# Patient Record
Sex: FEMALE | Race: WHITE | NOT HISPANIC OR LATINO | Employment: UNEMPLOYED | URBAN - METROPOLITAN AREA
[De-identification: names, ages, dates, MRNs, and addresses within clinical notes are randomized per-mention and may not be internally consistent; named-entity substitution may affect disease eponyms.]

---

## 2018-11-20 ENCOUNTER — CONSULT (OUTPATIENT)
Dept: VASCULAR SURGERY | Facility: CLINIC | Age: 38
End: 2018-11-20
Payer: COMMERCIAL

## 2018-11-20 VITALS
HEART RATE: 90 BPM | SYSTOLIC BLOOD PRESSURE: 128 MMHG | HEIGHT: 70 IN | BODY MASS INDEX: 20.62 KG/M2 | WEIGHT: 144 LBS | TEMPERATURE: 98.5 F | DIASTOLIC BLOOD PRESSURE: 78 MMHG

## 2018-11-20 DIAGNOSIS — I83.893 SYMPTOMATIC VARICOSE VEINS OF BOTH LOWER EXTREMITIES: Primary | ICD-10-CM

## 2018-11-20 PROCEDURE — 99203 OFFICE O/P NEW LOW 30 MIN: CPT | Performed by: PHYSICIAN ASSISTANT

## 2018-11-20 NOTE — PATIENT INSTRUCTIONS
Varicose Veins - symptomatic      L>R symptomatic by lateral varicose veins  Longstanding history of varicose veins present which which worsened after pregnancy  There is a left inner thigh truncal a group of veins which occasionally are painful  There is also a small group of spider veins to the right calf which are not particularly bothersome  She complains of leg heaviness and bilateral lower extremity edema at times  She also has symptoms at night on occasion with heavy it she legs  There are good 2+ PT DP pulses bilaterally  The extremities are warm and well perfused  Bilateral VV; group of left thigh truncal veins which bulge and are tender at times  There is also a bulging tender vein in the left foot  No wounds  We had a detailed discussion regarding the pathophysiology of venous disease in the treatment thereof  We discussed that venous disease is largely treated based on symptoms  We discussed conservative measures for the treatment of venous disease  At this juncture, we would like to start formal, graded compression stockings  We briefly discussed surgical options should her symptoms become progressive and troublesome on a daily basis  Recommendations:  - Order for prescription graded compression stockings of 20-30 mm Hg  - Wear stocking EVERY day to help control swelling in legs and remove at night  - Elevate legs while at rest  - Continue healthy life-style changes; heart-healthy, low sodium diet; regular exercise for weight loss  - Recommend skin moisturizer for your legs  - Patient education for venous insufficiency  - Follow up as needed for any worsening of symptoms - swelling, pain, fatigue, aching, heaviness  Varicose Veins   WHAT YOU NEED TO KNOW:   What are varicose veins? Varicose veins are veins that become large, twisted, and swollen  They are common on the back of the calves, knees, and thighs   Varicose veins are caused by valves in your veins that do not work properly  This causes blood to collect and increase pressure in the veins of your legs  The increased pressure causes your veins to stretch, get larger, swell, and twist        What increases my risk for varicose veins? · Pregnancy    · A family history of varicose veins    · Being overweight or obese    · Age 48 years or older    · Sitting or standing for long periods of time    · Wearing tight clothing  What are the signs and symptoms of varicose veins? Your symptoms may be worse after you stand or sit for long periods of time  You may have any of the following:  · Blue, purple, or bulging veins in your legs     · Pain, swelling, or muscle cramps in your legs    · Feeling of fatigue or heaviness in your legs  How are varicose veins diagnosed? Your healthcare provider will examine your legs and ask about your medical history  You may need tests, such as a Doppler ultrasound or duplex scan  These tests show your veins and valves, and how your blood is flowing through them  These tests may also show if there is a blockage or blood clot  How are varicose veins treated? The goal of treatment is to decrease symptoms, improve appearance, and prevent further problems  Treatment will depend on which veins are affected and how severe your condition is  You may need procedures to treat or remove your varicose veins  For example, your healthcare provider may inject a solution or use a laser to close the varicose veins  Surgery to remove long veins may also be done  Ask your healthcare provider for more information about procedures used to treat varicose veins  What can I do to manage my symptoms? · Do not sit or stand for long periods of time  This can cause the blood to collect in your legs and make your symptoms worse  Bend or rotate your ankles several times every hour  Walk around for a few minutes every hour to get blood moving in your legs  · Do not cross your legs when you sit    This decreases blood flow to your feet and can make your symptoms worse  · Do not wear tight clothing or shoes  Do not wear high-heeled shoes  Do not wear clothes that are tight around the waist or knees  · Maintain a healthy weight  Being overweight or obese can make your varicose veins worse  Ask your healthcare provider how much you should weigh  Ask him or her to help you create a weight loss plan if you are overweight  · Wear pressure stockings as directed  The stockings are tight and put pressure on your legs  They improve blood flow and help prevent clots  · Elevate your legs  Keep them above the level of your heart for 15 to 30 minutes several times a day  You can also prop the end of your bed up slightly to elevate your legs while you sleep  This will help blood to flow back to your heart  · Get regular exercise  Talk to your healthcare provider about the best exercise plan for you  Exercise can improve blood flow to your legs and feet  When should I seek immediate care? · You have a wound that does not heal or is infected  · You have an injury that has broken your skin and caused your varicose veins to bleed  · Your leg is swollen and hard  · You notice that your legs or feet are turning blue or black  · Your leg feels warm, tender, and painful  It may look swollen and red  When should I contact my healthcare provider? · You have pain in your leg that does not go away or gets worse  · You notice sudden large bruising on your legs  · You have a rash on your leg  · Your symptoms keep you from doing your daily activities  · You have questions or concerns about your condition or care  CARE AGREEMENT:   You have the right to help plan your care  Learn about your health condition and how it may be treated  Discuss treatment options with your caregivers to decide what care you want to receive  You always have the right to refuse treatment   The above information is an  only  It is not intended as medical advice for individual conditions or treatments  Talk to your doctor, nurse or pharmacist before following any medical regimen to see if it is safe and effective for you  © 2017 [employer] Information is for End User's use only and may not be sold, redistributed or otherwise used for commercial purposes  All illustrations and images included in CareNotes® are the copyrighted property of [employer]  or [employer]

## 2018-11-20 NOTE — PROGRESS NOTES
Assessment/Plan:    Symptomatic varicose veins of both lower extremities  39 y/o well F who presents to discuss varicose veins  L>R symptomatic bilateral varicose veins  Longstanding history of varicose veins present which which worsened after pregnancy  She complains of leg heaviness and bilateral lower extremity edema, at times particularly during the summer  She also has symptoms at night on occasion - with heaviness in the legs  There is a group of LEFT inner thigh truncal veins which occasionally are painful  She also has a large varicosity on the L foot which she doesn't like  There is also a small group of spider veins to the right calf which are not particularly bothersome  She has no high risk features  No history bleeding, blood clots or phlebitis  Exam:  There are good 2+ PT DP pulses bilaterally  The extremities are warm and well perfused  Minimal edema  Calves soft  Very mild venous stasis changes on the L medial ankle  Bilateral VV with images below; group of left thigh truncal veins which bulge and are tender at times  There is also a bulging tender vein in the left foot  No wounds  Discussion:  We had a detailed discussion regarding the pathophysiology of venous disease in the treatment thereof  We discussed that venous disease is largely treated based on symptoms  We discussed the conservative measures for treatment of venous disease  At this juncture, we would like to start formal, graded compression stockings  We briefly discussed potential surgical options should her symptoms become progressive and troublesome     -Conservative measures for varicose veins  -Prescription given for graded compression stockings  -We discussed that should she developed increased pain - particularly to the group veins in the left thigh, she can try ibuprofen or warm compresses    We discussed phlebitis/thrombophlebitis which would be a reason that she should return sooner   -We can see her back on an as needed basis  Subjective:      Patient ID: Temo Snyder is a 40 y o  female  Patient is new to the practice and presents today for evaluation of bilateral varicose veins that began occurring at the age of 14  Patient complains of bulging veins, aching, tiredness, heaviness, cramping, burning and pain of left leg  She does not wear compression stockings and has not had any testing done  No history of prior treatment  HPI   39 y/o well F who presents to discuss varicose veins  L>R symptomatic bilateral varicose veins  Longstanding history of varicose veins present which which worsened after pregnancy  She complains of leg heaviness and bilateral lower extremity edema, at times particularly during the summer  She also has symptoms at night on occasion - with heaviness in the legs  There is a group of LEFT inner thigh truncal veins which occasionally are painful  She also has a large varicosity on the L foot which she doesn't like  There is also a small group of spider veins to the right calf which are not particularly bothersome  She has no high risk features  No history bleeding, blood clots or phlebitis  The following portions of the patient's history were reviewed and updated as appropriate: allergies, current medications, past family history, past medical history, past social history, past surgical history and problem list     No chest pain or shortness of breath  No history of diabetes mellitus, hypertension  Review of Systems   Constitutional: Negative  HENT: Negative  Eyes: Negative  Respiratory: Negative  Cardiovascular: Negative  Gastrointestinal: Negative  Endocrine: Negative  Genitourinary: Negative  Musculoskeletal:        Leg pain   Skin: Negative  Allergic/Immunologic: Negative  Neurological: Negative  Hematological: Negative  Psychiatric/Behavioral: Negative            Objective:      /78 (BP Location: Right arm, Patient Position: Sitting)   Pulse 90   Temp 98 5 °F (36 9 °C) (Tympanic)   Ht 5' 10" (1 778 m)   Wt 65 3 kg (144 lb)   BMI 20 66 kg/m²           LEFT medial thigh         LEFT medial         LEFT foot          R posterior calf     Physical Exam   Constitutional: She is oriented to person, place, and time  She appears well-developed and well-nourished  She is cooperative  HENT:   Head: Normocephalic and atraumatic  Eyes: Pupils are equal, round, and reactive to light  EOM are normal    Neck: Trachea normal  Neck supple  No JVD present  Cardiovascular: Normal rate, regular rhythm, S1 normal, S2 normal and normal heart sounds  Exam reveals no gallop and no friction rub  No murmur heard  Pulses:       Carotid pulses are 2+ on the right side, and 2+ on the left side  Radial pulses are 2+ on the right side, and 2+ on the left side  Dorsalis pedis pulses are 2+ on the right side, and 2+ on the left side  Posterior tibial pulses are 2+ on the right side, and 2+ on the left side  + vv bilaterally    L thigh group up to 13 cm long; veins up to 1 about cm    Right posterior calf   Pulmonary/Chest: Effort normal and breath sounds normal  No accessory muscle usage  No respiratory distress  She has no wheezes  She has no rales  Abdominal: Soft  Bowel sounds are normal  She exhibits no distension  There is no hepatosplenomegaly  There is no tenderness  Musculoskeletal: Normal range of motion  She exhibits edema (trace)  She exhibits no deformity  Neurological: She is alert and oriented to person, place, and time  Grossly normal    Skin: Skin is warm and dry  No lesion and no rash noted  No cyanosis  Nails show no clubbing  Psychiatric: She has a normal mood and affect  Nursing note and vitals reviewed

## 2018-11-20 NOTE — ASSESSMENT & PLAN NOTE
39 y/o well F who presents to discuss varicose veins  L>R symptomatic bilateral varicose veins  Longstanding history of varicose veins present which which worsened after pregnancy  She complains of leg heaviness and bilateral lower extremity edema, at times particularly during the summer  She also has symptoms at night on occasion - with heaviness in the legs  There is a group of LEFT inner thigh truncal veins which occasionally are painful  She also has a large varicosity on the L foot which she doesn't like  There is also a small group of spider veins to the right calf which are not particularly bothersome  She has no high risk features  No history bleeding, blood clots or phlebitis  Exam:  There are good 2+ PT DP pulses bilaterally  The extremities are warm and well perfused  Minimal edema  Calves soft  Very mild venous stasis changes on the L medial ankle  Bilateral VV with images below; group of left thigh truncal veins which bulge and are tender at times  There is also a bulging tender vein in the left foot  No wounds  Discussion:  We had a detailed discussion regarding the pathophysiology of venous disease in the treatment thereof  We discussed that venous disease is largely treated based on symptoms  We discussed the conservative measures for treatment of venous disease  At this juncture, we would like to start formal, graded compression stockings  We briefly discussed potential surgical options should her symptoms become progressive and troublesome     -Conservative measures for varicose veins  -Prescription given for graded compression stockings  -We discussed that should she developed increased pain - particularly to the group veins in the left thigh, she can try ibuprofen or warm compresses  We discussed phlebitis/thrombophlebitis which would be a reason that she should return sooner   -We can see her back on an as needed basis

## 2018-11-20 NOTE — LETTER
November 20, 2018     Suki Landin[address]    Patient: Viola Clement   YOB: 1980   Date of Visit: 11/20/2018     Dear Dr Rachelle José Recipients      Thank you for referring Viola Clement to me for evaluation  Below are the relevant portions of my assessment and plan of care  If you have questions, please do not hesitate to call me  I look forward to following [facility] along with you  Sincerely,        Zoë Walsh PA-C        CC: No Recipients    Progress Notes:      Symptomatic varicose veins of both lower extremities  41 y/o well F who presents to discuss varicose veins  L>R symptomatic bilateral varicose veins  Longstanding history of varicose veins present which which worsened after pregnancy  She complains of leg heaviness and bilateral lower extremity edema at times  She also has symptoms at night on occasion with heavy it she legs  There is a group of LEFT inner thigh truncal veins which occasionally are painful  She also has a large varicosity on the L foot which she doesn't like  There is also a small group of spider veins to the right calf which are not particularly bothersome  Exam:  There are good 2+ PT DP pulses bilaterally  The extremities are warm and well perfused  Bilateral VV with images below; group of left thigh truncal veins which bulge and are tender at times  There is also a bulging tender vein in the left foot  No wounds  Discussion:  We had a detailed discussion regarding the pathophysiology of venous disease in the treatment thereof  We discussed that venous disease is largely treated based on symptoms  We discussed the conservative measures for treatment of venous disease  At this juncture, we would like to start formal, graded compression stockings    We briefly discussed potential surgical options should her symptoms become progressive and troublesome     -conservative measures for varicose veins  -prescription given for graded compression stockings  -we can see her back on an as needed basis